# Patient Record
Sex: FEMALE | Race: OTHER | NOT HISPANIC OR LATINO
[De-identification: names, ages, dates, MRNs, and addresses within clinical notes are randomized per-mention and may not be internally consistent; named-entity substitution may affect disease eponyms.]

---

## 2017-05-08 ENCOUNTER — APPOINTMENT (OUTPATIENT)
Dept: ORTHOPEDIC SURGERY | Facility: CLINIC | Age: 52
End: 2017-05-08

## 2017-05-08 VITALS — WEIGHT: 175 LBS | HEIGHT: 64 IN | BODY MASS INDEX: 29.88 KG/M2

## 2017-05-08 VITALS — SYSTOLIC BLOOD PRESSURE: 120 MMHG | HEART RATE: 86 BPM | DIASTOLIC BLOOD PRESSURE: 82 MMHG

## 2017-05-08 DIAGNOSIS — M54.16 RADICULOPATHY, LUMBAR REGION: ICD-10-CM

## 2017-05-08 DIAGNOSIS — M51.26 OTHER INTERVERTEBRAL DISC DISPLACEMENT, LUMBAR REGION: ICD-10-CM

## 2017-05-08 DIAGNOSIS — M51.36 OTHER INTERVERTEBRAL DISC DEGENERATION, LUMBAR REGION: ICD-10-CM

## 2017-05-24 ENCOUNTER — APPOINTMENT (OUTPATIENT)
Dept: ORTHOPEDIC SURGERY | Facility: CLINIC | Age: 52
End: 2017-05-24

## 2017-07-17 ENCOUNTER — CHART COPY (OUTPATIENT)
Age: 52
End: 2017-07-17

## 2018-10-12 ENCOUNTER — APPOINTMENT (OUTPATIENT)
Dept: ORTHOPEDIC SURGERY | Facility: CLINIC | Age: 53
End: 2018-10-12
Payer: COMMERCIAL

## 2018-10-12 VITALS — HEIGHT: 64 IN | BODY MASS INDEX: 29.88 KG/M2 | WEIGHT: 175 LBS

## 2018-10-12 DIAGNOSIS — M25.512 PAIN IN LEFT SHOULDER: ICD-10-CM

## 2018-10-12 DIAGNOSIS — G89.29 PAIN IN LEFT SHOULDER: ICD-10-CM

## 2018-10-12 DIAGNOSIS — M77.12 LATERAL EPICONDYLITIS, LEFT ELBOW: ICD-10-CM

## 2018-10-12 PROCEDURE — 99214 OFFICE O/P EST MOD 30 MIN: CPT

## 2018-10-12 PROCEDURE — 73030 X-RAY EXAM OF SHOULDER: CPT | Mod: LT

## 2018-10-20 ENCOUNTER — OUTPATIENT (OUTPATIENT)
Dept: OUTPATIENT SERVICES | Facility: HOSPITAL | Age: 53
LOS: 1 days | End: 2018-10-20

## 2018-10-20 DIAGNOSIS — M25.512 PAIN IN LEFT SHOULDER: ICD-10-CM

## 2018-10-21 ENCOUNTER — FORM ENCOUNTER (OUTPATIENT)
Age: 53
End: 2018-10-21

## 2018-10-22 ENCOUNTER — APPOINTMENT (OUTPATIENT)
Dept: MRI IMAGING | Facility: CLINIC | Age: 53
End: 2018-10-22
Payer: COMMERCIAL

## 2018-10-22 ENCOUNTER — OUTPATIENT (OUTPATIENT)
Dept: OUTPATIENT SERVICES | Facility: HOSPITAL | Age: 53
LOS: 1 days | End: 2018-10-22
Payer: COMMERCIAL

## 2018-10-22 DIAGNOSIS — M25.512 PAIN IN LEFT SHOULDER: ICD-10-CM

## 2018-10-22 PROCEDURE — 73221 MRI JOINT UPR EXTREM W/O DYE: CPT | Mod: 26,LT

## 2018-10-22 PROCEDURE — 73221 MRI JOINT UPR EXTREM W/O DYE: CPT

## 2018-10-25 ENCOUNTER — RX CHANGE (OUTPATIENT)
Age: 53
End: 2018-10-25

## 2018-10-25 DIAGNOSIS — M75.42 IMPINGEMENT SYNDROME OF LEFT SHOULDER: ICD-10-CM

## 2019-08-19 ENCOUNTER — APPOINTMENT (OUTPATIENT)
Dept: OTOLARYNGOLOGY | Facility: CLINIC | Age: 54
End: 2019-08-19

## 2019-09-03 ENCOUNTER — APPOINTMENT (OUTPATIENT)
Dept: MRI IMAGING | Facility: CLINIC | Age: 54
End: 2019-09-03

## 2019-09-25 PROBLEM — N63.0 LUMP IN FEMALE BREAST: Status: ACTIVE | Noted: 2019-09-25

## 2019-09-25 PROBLEM — M51.36 DISC DEGENERATION, LUMBAR: Status: ACTIVE | Noted: 2017-05-08

## 2019-09-26 ENCOUNTER — APPOINTMENT (OUTPATIENT)
Dept: SURGICAL ONCOLOGY | Facility: CLINIC | Age: 54
End: 2019-09-26
Payer: COMMERCIAL

## 2019-09-26 VITALS
WEIGHT: 171 LBS | SYSTOLIC BLOOD PRESSURE: 154 MMHG | DIASTOLIC BLOOD PRESSURE: 82 MMHG | OXYGEN SATURATION: 98 % | HEIGHT: 64 IN | HEART RATE: 71 BPM | BODY MASS INDEX: 29.19 KG/M2

## 2019-09-26 DIAGNOSIS — N63.0 UNSPECIFIED LUMP IN UNSPECIFIED BREAST: ICD-10-CM

## 2019-09-26 PROCEDURE — 99204 OFFICE O/P NEW MOD 45 MIN: CPT

## 2019-10-14 ENCOUNTER — FORM ENCOUNTER (OUTPATIENT)
Age: 54
End: 2019-10-14

## 2019-10-15 ENCOUNTER — OUTPATIENT (OUTPATIENT)
Dept: OUTPATIENT SERVICES | Facility: HOSPITAL | Age: 54
LOS: 1 days | End: 2019-10-15
Payer: COMMERCIAL

## 2019-10-15 ENCOUNTER — APPOINTMENT (OUTPATIENT)
Dept: MAMMOGRAPHY | Facility: IMAGING CENTER | Age: 54
End: 2019-10-15
Payer: COMMERCIAL

## 2019-10-15 ENCOUNTER — APPOINTMENT (OUTPATIENT)
Dept: ULTRASOUND IMAGING | Facility: IMAGING CENTER | Age: 54
End: 2019-10-15
Payer: COMMERCIAL

## 2019-10-15 DIAGNOSIS — Z00.00 ENCOUNTER FOR GENERAL ADULT MEDICAL EXAMINATION WITHOUT ABNORMAL FINDINGS: ICD-10-CM

## 2019-10-15 PROCEDURE — G0279: CPT | Mod: 26

## 2019-10-15 PROCEDURE — 76641 ULTRASOUND BREAST COMPLETE: CPT

## 2019-10-15 PROCEDURE — 77066 DX MAMMO INCL CAD BI: CPT | Mod: 26

## 2019-10-15 PROCEDURE — G0279: CPT

## 2019-10-15 PROCEDURE — 77066 DX MAMMO INCL CAD BI: CPT

## 2019-10-15 PROCEDURE — 76641 ULTRASOUND BREAST COMPLETE: CPT | Mod: 26,50

## 2019-10-16 ENCOUNTER — TRANSCRIPTION ENCOUNTER (OUTPATIENT)
Age: 54
End: 2019-10-16

## 2019-11-14 ENCOUNTER — APPOINTMENT (OUTPATIENT)
Dept: SURGICAL ONCOLOGY | Facility: AMBULATORY SURGERY CENTER | Age: 54
End: 2019-11-14

## 2020-01-03 NOTE — ASSESSMENT
[FreeTextEntry1] : Most recent breast imaging July 2018 @[facility] demonstrated an enlarging sebaceous cyst: BI-RADS-2\par Should have bilateral mammogram and sonogram presently..- .Prescription entered for our location, and discs from prior study submitted for entry into our system.\par \par If unremarkable, she should have a breast MRI (baseline), her breast cancer risk score is 28...........................\par \par Regarding the right breast epidermal inclusion cyst, observation, with the option of excision was reviewed.\par Removal of the lump is her preference.\par Because of size and location excision will be coordinated with plastic surgery.\par Paper submitted.\par \par All questions answered.\par \par Note dictated\par \par \par \par \par October 2019.\par \par Annual, bilateral, mammogram and sonogram at [facility]: BI-RADS 2.\par Will repeat in another year, October 2020..........................\par \par Above operation was scheduled, the patient called to postponed because she developed sudden hearing loss.\par She will call to reschedule at her convenience.

## 2020-01-03 NOTE — HISTORY OF PRESENT ILLNESS
[de-identified] : 53-year-old lady referred by her internist Dr. Luciano SALAZAR for baseline breast examination.\par \par She has had an epidermal inclusion cyst lower inner RIGHT BREAST since at least .\par She had a drainage procedure performed in ; .Intervention was in a doctor's office, she does not recall any of the details.\par \par No other procedures related to either breast.\par \par No other signs or symptoms with either breast.\par \par No personal history of malignancy.\par \par No relatives with breast cancer.\par \par No relatives with ovarian cancer.\par \par [place].\par \par No relatives with a history of malignancy.\par \par Menarche at 10.\par  5, para one, at 25.\par She still has menstrual periods, but they are irregular.\par No hormone replacement therapy.\par \par Her breast cancer risk score is 28.0\par \par \par \par Most recent breast imaging on record is 2018 from [facility]: BI-RADS 2.\par That study described a" interval increase" in the sebaceous cysts of her right breast (4:00) compared with 2016; at that time measured up to 1.8 cm.\par \par \par \par PMD: DR Luciano SALAZAR\par \par +DIABETES, rx'd with Metformin and Tradjenta.\par She takes Synthroid for hypothyroidism\par She does not see an endocrinologist\par Blood pressure was controlled with Metoprolol.\par Simvastatin for hypercholesterolemia.\par \par Gynecologist: Dr. Suzanne GAMBINO.\par Summary 2018 visit was unremarkable\par \par \par 2019 colonoscopy with Dr. James Naik was unremarkable

## 2020-01-03 NOTE — REVIEW OF SYSTEMS
[Negative] : Heme/Lymph [FreeTextEntry5] : Hypertension [FreeTextEntry8] : heartburn [de-identified] : Diabetes

## 2020-01-03 NOTE — PHYSICAL EXAM
[Normal] : supple, no neck mass and thyroid not enlarged [Normal Neck Lymph Nodes] : normal neck lymph nodes  [Normal Supraclavicular Lymph Nodes] : normal supraclavicular lymph nodes [Normal Groin Lymph Nodes] : normal groin lymph nodes [Normal Axillary Lymph Nodes] : normal axillary lymph nodes [Normal] : full range of motion and no deformities appreciated [de-identified] : Groins not examined [de-identified] : Below [de-identified] : Below

## 2020-10-27 ENCOUNTER — OUTPATIENT (OUTPATIENT)
Dept: OUTPATIENT SERVICES | Facility: HOSPITAL | Age: 55
LOS: 1 days | End: 2020-10-27
Payer: COMMERCIAL

## 2020-10-27 ENCOUNTER — APPOINTMENT (OUTPATIENT)
Dept: ULTRASOUND IMAGING | Facility: IMAGING CENTER | Age: 55
End: 2020-10-27
Payer: COMMERCIAL

## 2020-10-27 ENCOUNTER — APPOINTMENT (OUTPATIENT)
Dept: MAMMOGRAPHY | Facility: IMAGING CENTER | Age: 55
End: 2020-10-27
Payer: COMMERCIAL

## 2020-10-27 DIAGNOSIS — Z00.8 ENCOUNTER FOR OTHER GENERAL EXAMINATION: ICD-10-CM

## 2020-10-27 PROCEDURE — 77063 BREAST TOMOSYNTHESIS BI: CPT

## 2020-10-27 PROCEDURE — 76641 ULTRASOUND BREAST COMPLETE: CPT | Mod: 26,50

## 2020-10-27 PROCEDURE — 77067 SCR MAMMO BI INCL CAD: CPT

## 2020-10-27 PROCEDURE — 76641 ULTRASOUND BREAST COMPLETE: CPT

## 2020-10-27 PROCEDURE — 77063 BREAST TOMOSYNTHESIS BI: CPT | Mod: 26

## 2020-10-27 PROCEDURE — 77067 SCR MAMMO BI INCL CAD: CPT | Mod: 26

## 2020-11-12 ENCOUNTER — APPOINTMENT (OUTPATIENT)
Dept: RADIOLOGY | Facility: IMAGING CENTER | Age: 55
End: 2020-11-12
Payer: COMMERCIAL

## 2020-11-12 ENCOUNTER — OUTPATIENT (OUTPATIENT)
Dept: OUTPATIENT SERVICES | Facility: HOSPITAL | Age: 55
LOS: 1 days | End: 2020-11-12
Payer: COMMERCIAL

## 2020-11-12 DIAGNOSIS — Z00.8 ENCOUNTER FOR OTHER GENERAL EXAMINATION: ICD-10-CM

## 2020-11-12 PROCEDURE — 77080 DXA BONE DENSITY AXIAL: CPT

## 2020-11-12 PROCEDURE — 77080 DXA BONE DENSITY AXIAL: CPT | Mod: 26

## 2021-02-22 ENCOUNTER — TRANSCRIPTION ENCOUNTER (OUTPATIENT)
Age: 56
End: 2021-02-22

## 2022-01-04 ENCOUNTER — APPOINTMENT (OUTPATIENT)
Dept: UROGYNECOLOGY | Facility: CLINIC | Age: 57
End: 2022-01-04

## 2022-01-04 ENCOUNTER — RESULT REVIEW (OUTPATIENT)
Age: 57
End: 2022-01-04

## 2022-01-20 ENCOUNTER — APPOINTMENT (OUTPATIENT)
Dept: RADIOLOGY | Facility: IMAGING CENTER | Age: 57
End: 2022-01-20

## 2022-02-08 ENCOUNTER — APPOINTMENT (OUTPATIENT)
Dept: ULTRASOUND IMAGING | Facility: IMAGING CENTER | Age: 57
End: 2022-02-08

## 2022-02-08 ENCOUNTER — APPOINTMENT (OUTPATIENT)
Dept: MAMMOGRAPHY | Facility: IMAGING CENTER | Age: 57
End: 2022-02-08

## 2022-05-12 ENCOUNTER — NON-APPOINTMENT (OUTPATIENT)
Age: 57
End: 2022-05-12

## 2022-05-12 ENCOUNTER — APPOINTMENT (OUTPATIENT)
Dept: CARDIOLOGY | Facility: CLINIC | Age: 57
End: 2022-05-12
Payer: COMMERCIAL

## 2022-05-12 VITALS — SYSTOLIC BLOOD PRESSURE: 136 MMHG | DIASTOLIC BLOOD PRESSURE: 76 MMHG

## 2022-05-12 VITALS
DIASTOLIC BLOOD PRESSURE: 76 MMHG | SYSTOLIC BLOOD PRESSURE: 138 MMHG | HEART RATE: 67 BPM | OXYGEN SATURATION: 100 % | BODY MASS INDEX: 28.32 KG/M2 | WEIGHT: 165 LBS

## 2022-05-12 DIAGNOSIS — L28.0 OTHER ORAL MUCOSITIS (ULCERATIVE): ICD-10-CM

## 2022-05-12 DIAGNOSIS — K12.39 OTHER ORAL MUCOSITIS (ULCERATIVE): ICD-10-CM

## 2022-05-12 DIAGNOSIS — R94.31 ABNORMAL ELECTROCARDIOGRAM [ECG] [EKG]: ICD-10-CM

## 2022-05-12 DIAGNOSIS — E78.00 PURE HYPERCHOLESTEROLEMIA, UNSPECIFIED: ICD-10-CM

## 2022-05-12 PROCEDURE — 93000 ELECTROCARDIOGRAM COMPLETE: CPT

## 2022-05-12 PROCEDURE — 99204 OFFICE O/P NEW MOD 45 MIN: CPT

## 2022-05-12 RX ORDER — DAPAGLIFLOZIN 5 MG/1
5 TABLET, FILM COATED ORAL
Refills: 0 | Status: ACTIVE | COMMUNITY

## 2022-05-12 RX ORDER — ROSUVASTATIN CALCIUM 5 MG/1
5 TABLET, FILM COATED ORAL
Refills: 0 | Status: ACTIVE | COMMUNITY

## 2022-05-12 RX ORDER — AMLODIPINE BESYLATE 5 MG/1
5 TABLET ORAL
Refills: 0 | Status: ACTIVE | COMMUNITY

## 2022-05-12 RX ORDER — METFORMIN HYDROCHLORIDE 1000 MG/1
1000 TABLET, COATED ORAL
Refills: 0 | Status: ACTIVE | COMMUNITY

## 2022-05-12 RX ORDER — HYDROCHLOROTHIAZIDE 12.5 MG/1
12.5 TABLET ORAL
Refills: 0 | Status: ACTIVE | COMMUNITY

## 2022-05-13 PROBLEM — R94.31 ABNORMAL ECG DURING EXERCISE STRESS TEST: Status: ACTIVE | Noted: 2022-05-12

## 2022-05-13 PROBLEM — R94.31 ABNORMAL ECG: Status: ACTIVE | Noted: 2022-05-12

## 2022-05-13 NOTE — HISTORY OF PRESENT ILLNESS
[FreeTextEntry1] : Dear Bre,\par Thank you for referring her for cardiovascular evaluation.  She is a 56-year-old with a history of diabetes mellitus, hypertension, hyperlipidemia and palpitations who sought a new cardiologist.\par Her most consistent pressing complaint is related to palpitations.  She describes episodes of extra beats that occur in her chest and last for a second or 2.  They resolve spontaneously and are sometimes associated with transient lightheadedness.  No syncope or near syncope is reported.  Work-up for this including a cardiac monitor and nuclear stress test were unremarkable aside from PVCs.\par She exercises 5 or 6 days a week on a bicycle or treadmill but does not exert herself "too much" she is concerned about elevating her heart rate because it does not always come down quickly.\par She denies any exertional chest pains or shortness of breath with her routine exertion.\par Nuclear stress test performed on 10/21/2020 revealed 7 minutes and 16 seconds of exercise using a Dominick protocol with a baseline heart rate of 80 bpm increasing up to 151 bpm.  Resting blood pressure was 120/70 and increased to 170/70.  At peak stress there were 1.5 to 2 mm downward sloping ST segment depressions in leads V3 through V6.  No angina was reported.  Myocardial perfusion images were normal.  3 times daily ratio was normal.  Ejection fraction was estimated at 58%.\par Her most recent LDL was 135 mg/dL and HDL of 46 mg/dL.\par She has no history of coronary artery disease, smoking or family history of premature coronary artery disease.\par She reports recent dysfunctional uterine bleeding.\par She has been taken off aspirin by previous physician.\par

## 2022-05-13 NOTE — DISCUSSION/SUMMARY
[FreeTextEntry1] : She is a 56-year-old with longstanding diabetes mellitus, hypertension, hyperlipidemia with a history of palpitations and previous stress test with abnormal ECG response but normal myocardial perfusion images.\par Today's ECG shows some nonspecific T wave flattening compared with the 1 done a month ago.  She has no typical anginal symptoms and I would like to get her to be able to exercise more aggressively.  Toward that end I have scheduled her for an echocardiogram to exclude any structural heart disease as well as asked her to schedule a stress echocardiogram to verify her exercise capacity and exclude ischemic heart disease.\par Her palpitations appear benign and I reassured her that they are not likely to cause trouble as they have been going on for almost a decade without any high risk features.\par Hypertension: Blood pressure control is near optimal.  I have suggested exchanging carvedilol 6.25 mg twice daily for metoprolol given its better profile in diabetics.  She has been unable to tolerate ACE inhibitor's or ARB's in the past.\par Hyperlipidemia: Goal LDL should be less than 100 mg/dL.  She will have her blood work done in the next month or so on her new dose of Crestor.\par CAD primary prevention: Her cardiovascular risk is high enough to warrant daily aspirin therapy for primary prevention.  She will begin this once the source of her dysfunctional uterine bleeding is found.\par If no abnormalities are found I will encourage her to begin routine aerobic exercise.  She was reassured.\par Her history was reviewed with her daughter as well who was present for the entire evaluation.

## 2022-05-24 ENCOUNTER — RESULT REVIEW (OUTPATIENT)
Age: 57
End: 2022-05-24

## 2022-05-28 ENCOUNTER — APPOINTMENT (OUTPATIENT)
Dept: ULTRASOUND IMAGING | Facility: IMAGING CENTER | Age: 57
End: 2022-05-28
Payer: COMMERCIAL

## 2022-05-28 ENCOUNTER — APPOINTMENT (OUTPATIENT)
Dept: MAMMOGRAPHY | Facility: IMAGING CENTER | Age: 57
End: 2022-05-28
Payer: COMMERCIAL

## 2022-05-28 ENCOUNTER — OUTPATIENT (OUTPATIENT)
Dept: OUTPATIENT SERVICES | Facility: HOSPITAL | Age: 57
LOS: 1 days | End: 2022-05-28
Payer: COMMERCIAL

## 2022-05-28 DIAGNOSIS — Z00.8 ENCOUNTER FOR OTHER GENERAL EXAMINATION: ICD-10-CM

## 2022-05-28 PROCEDURE — 77067 SCR MAMMO BI INCL CAD: CPT

## 2022-05-28 PROCEDURE — 77067 SCR MAMMO BI INCL CAD: CPT | Mod: 26

## 2022-05-28 PROCEDURE — 77063 BREAST TOMOSYNTHESIS BI: CPT | Mod: 26

## 2022-05-28 PROCEDURE — 77063 BREAST TOMOSYNTHESIS BI: CPT

## 2022-06-20 ENCOUNTER — APPOINTMENT (OUTPATIENT)
Dept: CARDIOLOGY | Facility: CLINIC | Age: 57
End: 2022-06-20
Payer: COMMERCIAL

## 2022-06-20 PROCEDURE — 93306 TTE W/DOPPLER COMPLETE: CPT

## 2022-06-22 ENCOUNTER — APPOINTMENT (OUTPATIENT)
Dept: CARDIOLOGY | Facility: CLINIC | Age: 57
End: 2022-06-22

## 2022-06-22 PROCEDURE — 93351 STRESS TTE COMPLETE: CPT

## 2022-10-19 ENCOUNTER — RX RENEWAL (OUTPATIENT)
Age: 57
End: 2022-10-19

## 2022-10-19 RX ORDER — CARVEDILOL 6.25 MG/1
6.25 TABLET, FILM COATED ORAL TWICE DAILY
Qty: 60 | Refills: 2 | Status: ACTIVE | COMMUNITY
Start: 2022-05-12 | End: 1900-01-01

## 2023-05-30 ENCOUNTER — NON-APPOINTMENT (OUTPATIENT)
Age: 58
End: 2023-05-30

## 2023-05-30 ENCOUNTER — APPOINTMENT (OUTPATIENT)
Dept: CARDIOLOGY | Facility: CLINIC | Age: 58
End: 2023-05-30
Payer: COMMERCIAL

## 2023-05-30 VITALS
OXYGEN SATURATION: 99 % | WEIGHT: 152 LBS | HEIGHT: 64 IN | SYSTOLIC BLOOD PRESSURE: 120 MMHG | DIASTOLIC BLOOD PRESSURE: 78 MMHG | BODY MASS INDEX: 25.95 KG/M2 | HEART RATE: 72 BPM

## 2023-05-30 PROCEDURE — 99214 OFFICE O/P EST MOD 30 MIN: CPT | Mod: 25

## 2023-05-30 PROCEDURE — 93000 ELECTROCARDIOGRAM COMPLETE: CPT

## 2023-05-30 NOTE — HISTORY OF PRESENT ILLNESS
[FreeTextEntry1] : She was seen in the office today on an urgent basis.\par Over the weekend she had a diarrheal illness for unclear reasons.  During this episode she had multiple frequent extra heartbeats and palpitations.  No lightheadedness, dizziness or syncope.\par Her palpitations did disturb her sleep on occasions.  Her loose bowel movements have improved and she kept her self well-hydrated yesterday and reports feeling better.  The palpitations are almost completely gone as is the diarrhea.\par Throughout the episode she took her usual medications which include metformin, Farxiga and hydrochlorothiazide.\par She exercises on a treadmill, prior to her diarrheal illness, without difficulty.  She walks for the 3 to 4 miles an hour for 1 hour.\par \par Prior:\par Dear Bre,\par Thank you for referring her for cardiovascular evaluation.  She is a 56-year-old with a history of diabetes mellitus, hypertension, hyperlipidemia and palpitations who sought a new cardiologist.\par Her most consistent pressing complaint is related to palpitations.  She describes episodes of extra beats that occur in her chest and last for a second or 2.  They resolve spontaneously and are sometimes associated with transient lightheadedness.  No syncope or near syncope is reported.  Work-up for this including a cardiac monitor and nuclear stress test were unremarkable aside from PVCs.\par She exercises 5 or 6 days a week on a bicycle or treadmill but does not exert herself "too much" she is concerned about elevating her heart rate because it does not always come down quickly.\par She denies any exertional chest pains or shortness of breath with her routine exertion.\par Nuclear stress test performed on 10/21/2020 revealed 7 minutes and 16 seconds of exercise using a Dominick protocol with a baseline heart rate of 80 bpm increasing up to 151 bpm.  Resting blood pressure was 120/70 and increased to 170/70.  At peak stress there were 1.5 to 2 mm downward sloping ST segment depressions in leads V3 through V6.  No angina was reported.  Myocardial perfusion images were normal.  3 times daily ratio was normal.  Ejection fraction was estimated at 58%.\par Her most recent LDL was 135 mg/dL and HDL of 46 mg/dL.\par She has no history of coronary artery disease, smoking or family history of premature coronary artery disease.\par She reports recent dysfunctional uterine bleeding.\par She has been taken off aspirin by previous physician.\par

## 2023-05-30 NOTE — DISCUSSION/SUMMARY
[FreeTextEntry1] : She is a 57-year-old with longstanding diabetes mellitus, hypertension, hyperlipidemia with a history of palpitations and previous stress test with abnormal ECG response but normal myocardial perfusion images.\par Her diarrheal illness and likely electrolyte disorder is the most likely cause for her palpitations.  I instructed her to not take her metformin, Farxiga and hydrochlorothiazide when she is having diarrheal illness.\par She has normal urination now and almost complete resolution of her palpitations.\par Her ECG today shows normal sinus rhythm with no acute ST segment abnormalities.\par I reassured her that her symptoms are likely the result of transient electrolyte abnormalities that have resolved.\par No further testing is required.\par Call if any new symptoms arise, particularly on the treadmill.\par It is unclear why her statin was discontinued. [EKG obtained to assist in diagnosis and management of assessed problem(s)] : EKG obtained to assist in diagnosis and management of assessed problem(s)

## 2023-06-25 ENCOUNTER — APPOINTMENT (OUTPATIENT)
Dept: ULTRASOUND IMAGING | Facility: IMAGING CENTER | Age: 58
End: 2023-06-25
Payer: COMMERCIAL

## 2023-06-25 ENCOUNTER — OUTPATIENT (OUTPATIENT)
Dept: OUTPATIENT SERVICES | Facility: HOSPITAL | Age: 58
LOS: 1 days | End: 2023-06-25
Payer: COMMERCIAL

## 2023-06-25 DIAGNOSIS — Z00.8 ENCOUNTER FOR OTHER GENERAL EXAMINATION: ICD-10-CM

## 2023-06-25 PROCEDURE — 76536 US EXAM OF HEAD AND NECK: CPT

## 2023-06-25 PROCEDURE — 76536 US EXAM OF HEAD AND NECK: CPT | Mod: 26

## 2023-07-03 ENCOUNTER — APPOINTMENT (OUTPATIENT)
Dept: ULTRASOUND IMAGING | Facility: CLINIC | Age: 58
End: 2023-07-03
Payer: COMMERCIAL

## 2023-07-03 ENCOUNTER — APPOINTMENT (OUTPATIENT)
Dept: MAMMOGRAPHY | Facility: CLINIC | Age: 58
End: 2023-07-03
Payer: COMMERCIAL

## 2023-07-03 PROCEDURE — 76641 ULTRASOUND BREAST COMPLETE: CPT | Mod: 50

## 2023-07-03 PROCEDURE — 77067 SCR MAMMO BI INCL CAD: CPT

## 2023-07-03 PROCEDURE — 77063 BREAST TOMOSYNTHESIS BI: CPT

## 2023-07-19 ENCOUNTER — APPOINTMENT (OUTPATIENT)
Dept: MAMMOGRAPHY | Facility: IMAGING CENTER | Age: 58
End: 2023-07-19

## 2023-07-19 ENCOUNTER — APPOINTMENT (OUTPATIENT)
Dept: ULTRASOUND IMAGING | Facility: IMAGING CENTER | Age: 58
End: 2023-07-19

## 2023-08-12 ENCOUNTER — OUTPATIENT (OUTPATIENT)
Dept: OUTPATIENT SERVICES | Facility: HOSPITAL | Age: 58
LOS: 1 days | End: 2023-08-12
Payer: COMMERCIAL

## 2023-08-12 ENCOUNTER — APPOINTMENT (OUTPATIENT)
Dept: RADIOLOGY | Facility: IMAGING CENTER | Age: 58
End: 2023-08-12
Payer: COMMERCIAL

## 2023-08-12 DIAGNOSIS — R07.89 OTHER CHEST PAIN: ICD-10-CM

## 2023-08-12 PROCEDURE — 71046 X-RAY EXAM CHEST 2 VIEWS: CPT

## 2023-08-12 PROCEDURE — 71046 X-RAY EXAM CHEST 2 VIEWS: CPT | Mod: 26

## 2024-06-26 ENCOUNTER — APPOINTMENT (OUTPATIENT)
Dept: CARDIOLOGY | Facility: CLINIC | Age: 59
End: 2024-06-26
Payer: COMMERCIAL

## 2024-06-26 ENCOUNTER — NON-APPOINTMENT (OUTPATIENT)
Age: 59
End: 2024-06-26

## 2024-06-26 VITALS
DIASTOLIC BLOOD PRESSURE: 76 MMHG | WEIGHT: 165 LBS | OXYGEN SATURATION: 96 % | SYSTOLIC BLOOD PRESSURE: 120 MMHG | BODY MASS INDEX: 28.17 KG/M2 | HEIGHT: 64 IN | HEART RATE: 69 BPM

## 2024-06-26 DIAGNOSIS — I10 ESSENTIAL (PRIMARY) HYPERTENSION: ICD-10-CM

## 2024-06-26 DIAGNOSIS — R07.9 CHEST PAIN, UNSPECIFIED: ICD-10-CM

## 2024-06-26 DIAGNOSIS — R00.2 PALPITATIONS: ICD-10-CM

## 2024-06-26 PROCEDURE — 93000 ELECTROCARDIOGRAM COMPLETE: CPT

## 2024-06-26 PROCEDURE — 99214 OFFICE O/P EST MOD 30 MIN: CPT | Mod: 25

## 2024-07-17 ENCOUNTER — TRANSCRIPTION ENCOUNTER (OUTPATIENT)
Age: 59
End: 2024-07-17

## 2024-07-17 ENCOUNTER — NON-APPOINTMENT (OUTPATIENT)
Age: 59
End: 2024-07-17

## 2024-08-15 ENCOUNTER — APPOINTMENT (OUTPATIENT)
Dept: CARDIOLOGY | Facility: CLINIC | Age: 59
End: 2024-08-15
Payer: COMMERCIAL

## 2024-08-15 PROCEDURE — 78452 HT MUSCLE IMAGE SPECT MULT: CPT

## 2024-08-15 PROCEDURE — A9500: CPT

## 2024-08-15 PROCEDURE — 93015 CV STRESS TEST SUPVJ I&R: CPT

## 2025-04-25 ENCOUNTER — APPOINTMENT (OUTPATIENT)
Dept: RADIOLOGY | Facility: IMAGING CENTER | Age: 60
End: 2025-04-25
Payer: COMMERCIAL

## 2025-04-25 ENCOUNTER — OUTPATIENT (OUTPATIENT)
Dept: OUTPATIENT SERVICES | Facility: HOSPITAL | Age: 60
LOS: 1 days | End: 2025-04-25
Payer: COMMERCIAL

## 2025-04-25 PROCEDURE — 72070 X-RAY EXAM THORAC SPINE 2VWS: CPT | Mod: 26

## 2025-04-25 PROCEDURE — 71046 X-RAY EXAM CHEST 2 VIEWS: CPT

## 2025-04-25 PROCEDURE — 72070 X-RAY EXAM THORAC SPINE 2VWS: CPT

## 2025-04-25 PROCEDURE — 71046 X-RAY EXAM CHEST 2 VIEWS: CPT | Mod: 26

## 2025-06-23 ENCOUNTER — APPOINTMENT (OUTPATIENT)
Dept: CARDIOLOGY | Facility: CLINIC | Age: 60
End: 2025-06-23
Payer: COMMERCIAL

## 2025-06-23 VITALS
SYSTOLIC BLOOD PRESSURE: 128 MMHG | HEART RATE: 78 BPM | OXYGEN SATURATION: 97 % | WEIGHT: 158 LBS | BODY MASS INDEX: 26.98 KG/M2 | HEIGHT: 64 IN | DIASTOLIC BLOOD PRESSURE: 78 MMHG

## 2025-06-23 PROCEDURE — 93000 ELECTROCARDIOGRAM COMPLETE: CPT

## 2025-06-23 PROCEDURE — 99214 OFFICE O/P EST MOD 30 MIN: CPT

## 2025-06-23 PROCEDURE — G2211 COMPLEX E/M VISIT ADD ON: CPT | Mod: NC

## 2025-06-27 ENCOUNTER — APPOINTMENT (OUTPATIENT)
Dept: ULTRASOUND IMAGING | Facility: IMAGING CENTER | Age: 60
End: 2025-06-27

## 2025-06-27 ENCOUNTER — OUTPATIENT (OUTPATIENT)
Dept: OUTPATIENT SERVICES | Facility: HOSPITAL | Age: 60
LOS: 1 days | End: 2025-06-27
Payer: COMMERCIAL

## 2025-06-27 DIAGNOSIS — Z00.8 ENCOUNTER FOR OTHER GENERAL EXAMINATION: ICD-10-CM

## 2025-06-27 PROCEDURE — 76700 US EXAM ABDOM COMPLETE: CPT | Mod: 26

## 2025-06-27 PROCEDURE — 76700 US EXAM ABDOM COMPLETE: CPT

## 2025-07-02 ENCOUNTER — APPOINTMENT (OUTPATIENT)
Dept: CT IMAGING | Facility: IMAGING CENTER | Age: 60
End: 2025-07-02

## 2025-07-08 ENCOUNTER — APPOINTMENT (OUTPATIENT)
Dept: CT IMAGING | Facility: IMAGING CENTER | Age: 60
End: 2025-07-08